# Patient Record
Sex: FEMALE | Race: BLACK OR AFRICAN AMERICAN | NOT HISPANIC OR LATINO | Employment: OTHER | URBAN - METROPOLITAN AREA
[De-identification: names, ages, dates, MRNs, and addresses within clinical notes are randomized per-mention and may not be internally consistent; named-entity substitution may affect disease eponyms.]

---

## 2020-11-05 DIAGNOSIS — I49.5 SSS (SICK SINUS SYNDROME): Primary | ICD-10-CM

## 2020-11-27 ENCOUNTER — TELEPHONE (OUTPATIENT)
Dept: CARDIOLOGY | Facility: CLINIC | Age: 80
End: 2020-11-27

## 2020-11-27 NOTE — TELEPHONE ENCOUNTER
----- Message from Melodie Gaming sent at 11/27/2020  9:20 AM CST -----  Regarding: appt  please give patient a call she would like to be seen in [place] [phone]

## 2020-11-30 ENCOUNTER — TELEPHONE (OUTPATIENT)
Dept: CARDIOLOGY | Facility: CLINIC | Age: 80
End: 2020-11-30

## 2020-11-30 DIAGNOSIS — I49.5 SICK SINUS SYNDROME: Primary | ICD-10-CM

## 2020-11-30 NOTE — TELEPHONE ENCOUNTER
----- Message from Stuart Calle sent at 11/30/2020 10:07 AM CST -----  Regarding: - gp  Pacer check needed  Heart beating faster notices when laying down     Wants  seen as well   Ws in [facility] recently   Luis Carlos Man  Male, 84 y.o., 10/10/1936  [phone]  MRN:   [id number]      [phone]

## 2020-11-30 NOTE — TELEPHONE ENCOUNTER
Spoke with pt and will see Friday or per Dr. Candelario if pacer rep can check her will get her pacer checked first.

## 2021-03-05 ENCOUNTER — OFFICE VISIT (OUTPATIENT)
Dept: CARDIOLOGY | Facility: CLINIC | Age: 81
End: 2021-03-05
Payer: MEDICARE

## 2021-03-05 VITALS
DIASTOLIC BLOOD PRESSURE: 62 MMHG | HEART RATE: 76 BPM | BODY MASS INDEX: 31.66 KG/M2 | RESPIRATION RATE: 16 BRPM | SYSTOLIC BLOOD PRESSURE: 110 MMHG | WEIGHT: 197 LBS | HEIGHT: 66 IN | OXYGEN SATURATION: 98 %

## 2021-03-05 DIAGNOSIS — I49.5 SICK SINUS SYNDROME: Primary | ICD-10-CM

## 2021-03-05 DIAGNOSIS — Z95.0 CARDIAC PACEMAKER IN SITU: ICD-10-CM

## 2021-03-05 DIAGNOSIS — E78.2 MIXED HYPERLIPIDEMIA: ICD-10-CM

## 2021-03-05 DIAGNOSIS — M19.90 ARTHRITIS: ICD-10-CM

## 2021-03-05 DIAGNOSIS — I10 ESSENTIAL HYPERTENSION: ICD-10-CM

## 2021-03-05 DIAGNOSIS — E66.01 SEVERE OBESITY: ICD-10-CM

## 2021-03-05 PROCEDURE — 99214 OFFICE O/P EST MOD 30 MIN: CPT | Mod: S$GLB,,, | Performed by: INTERNAL MEDICINE

## 2021-03-05 PROCEDURE — 99214 PR OFFICE/OUTPT VISIT, EST, LEVL IV, 30-39 MIN: ICD-10-PCS | Mod: S$GLB,,, | Performed by: INTERNAL MEDICINE

## 2021-03-05 RX ORDER — ASPIRIN 81 MG/1
81 TABLET ORAL DAILY
COMMUNITY

## 2021-03-05 RX ORDER — FERROUS SULFATE 325(65) MG
325 TABLET ORAL
COMMUNITY

## 2021-03-05 RX ORDER — CARVEDILOL 12.5 MG/1
12.5 TABLET ORAL 2 TIMES DAILY
COMMUNITY
Start: 2021-02-17

## 2021-03-05 RX ORDER — VALSARTAN AND HYDROCHLOROTHIAZIDE 160; 12.5 MG/1; MG/1
1 TABLET, FILM COATED ORAL DAILY
COMMUNITY
Start: 2021-03-03

## 2021-03-05 RX ORDER — MELOXICAM 7.5 MG/1
7.5 TABLET ORAL DAILY PRN
COMMUNITY
Start: 2020-09-23

## 2021-03-05 RX ORDER — LATANOPROST 50 UG/ML
SOLUTION/ DROPS OPHTHALMIC
COMMUNITY
Start: 2021-03-02

## 2021-03-05 RX ORDER — SIMVASTATIN 80 MG/1
80 TABLET, FILM COATED ORAL DAILY
COMMUNITY
Start: 2021-02-22

## 2021-06-25 ENCOUNTER — HOSPITAL ENCOUNTER (OUTPATIENT)
Dept: CARDIOLOGY | Facility: CLINIC | Age: 81
Discharge: HOME | End: 2021-06-25
Attending: INTERNAL MEDICINE
Payer: MEDICARE

## 2021-08-09 DIAGNOSIS — I49.5 SSS (SICK SINUS SYNDROME): Primary | ICD-10-CM

## 2021-09-17 ENCOUNTER — OFFICE VISIT (OUTPATIENT)
Dept: CARDIOLOGY | Facility: CLINIC | Age: 81
End: 2021-09-17
Payer: MEDICARE

## 2021-09-17 VITALS
WEIGHT: 191 LBS | RESPIRATION RATE: 16 BRPM | HEIGHT: 66 IN | DIASTOLIC BLOOD PRESSURE: 62 MMHG | SYSTOLIC BLOOD PRESSURE: 126 MMHG | OXYGEN SATURATION: 98 % | BODY MASS INDEX: 30.7 KG/M2 | HEART RATE: 84 BPM

## 2021-09-17 DIAGNOSIS — E78.2 MIXED HYPERLIPIDEMIA: ICD-10-CM

## 2021-09-17 DIAGNOSIS — I49.5 SICK SINUS SYNDROME: Primary | ICD-10-CM

## 2021-09-17 DIAGNOSIS — Z95.0 CARDIAC PACEMAKER IN SITU: ICD-10-CM

## 2021-09-17 DIAGNOSIS — E66.8 MODERATE OBESITY: ICD-10-CM

## 2021-09-17 DIAGNOSIS — I10 ESSENTIAL HYPERTENSION: ICD-10-CM

## 2021-09-17 PROCEDURE — 99214 OFFICE O/P EST MOD 30 MIN: CPT | Mod: S$GLB,,, | Performed by: INTERNAL MEDICINE

## 2021-09-17 PROCEDURE — 93000 ELECTROCARDIOGRAM COMPLETE: CPT | Mod: S$GLB,,, | Performed by: INTERNAL MEDICINE

## 2021-09-17 PROCEDURE — 99214 PR OFFICE/OUTPT VISIT, EST, LEVL IV, 30-39 MIN: ICD-10-PCS | Mod: S$GLB,,, | Performed by: INTERNAL MEDICINE

## 2021-09-17 PROCEDURE — 93000 EKG 12-LEAD: ICD-10-PCS | Mod: S$GLB,,, | Performed by: INTERNAL MEDICINE

## 2021-12-10 ENCOUNTER — NURSE TRIAGE (OUTPATIENT)
Dept: ADMINISTRATIVE | Facility: CLINIC | Age: 81
End: 2021-12-10
Payer: MEDICARE

## 2022-03-25 ENCOUNTER — OFFICE VISIT (OUTPATIENT)
Dept: CARDIOLOGY | Facility: CLINIC | Age: 82
End: 2022-03-25
Payer: MEDICARE

## 2022-03-25 VITALS
DIASTOLIC BLOOD PRESSURE: 70 MMHG | SYSTOLIC BLOOD PRESSURE: 122 MMHG | BODY MASS INDEX: 30.37 KG/M2 | WEIGHT: 189 LBS | HEART RATE: 76 BPM | OXYGEN SATURATION: 98 % | HEIGHT: 66 IN

## 2022-03-25 DIAGNOSIS — I49.5 SSS (SICK SINUS SYNDROME): ICD-10-CM

## 2022-03-25 DIAGNOSIS — E66.8 MODERATE OBESITY: ICD-10-CM

## 2022-03-25 DIAGNOSIS — M19.90 ARTHRITIS: ICD-10-CM

## 2022-03-25 DIAGNOSIS — Z95.0 CARDIAC PACEMAKER IN SITU: ICD-10-CM

## 2022-03-25 DIAGNOSIS — E78.2 MIXED HYPERLIPIDEMIA: ICD-10-CM

## 2022-03-25 DIAGNOSIS — I10 ESSENTIAL HYPERTENSION: Primary | ICD-10-CM

## 2022-03-25 PROCEDURE — 99214 PR OFFICE/OUTPT VISIT, EST, LEVL IV, 30-39 MIN: ICD-10-PCS | Mod: S$GLB,,, | Performed by: INTERNAL MEDICINE

## 2022-03-25 PROCEDURE — 99214 OFFICE O/P EST MOD 30 MIN: CPT | Mod: S$GLB,,, | Performed by: INTERNAL MEDICINE

## 2022-03-25 PROCEDURE — 93005 EKG 12-LEAD: ICD-10-PCS | Mod: S$GLB,,, | Performed by: INTERNAL MEDICINE

## 2022-03-25 PROCEDURE — 93005 ELECTROCARDIOGRAM TRACING: CPT | Mod: S$GLB,,, | Performed by: INTERNAL MEDICINE

## 2022-03-25 NOTE — PROGRESS NOTES
Subjective:    Patient ID:  Ralph Man is a 81 y.o. female     Chief Complaint   Patient presents with    Follow-up    Chest Pain     A tingle in chest        HPI:  Ms Ralph Man is a 81 y.o. female is here for follow-up.  Patient has been doing well no specific complaints at the present time.  Her breathing has been good denies any shortness of breath or difficulty in breathing denies any chest pain or tightness or heaviness denies any loss of consciousness falls or head injury.  Patient has had episodes where she had transiently feels lightheaded when she moves her head forward.  And even so when she bends forward she does feel lightheaded and she has to raise her head back up very gently and then she feels okay.  She has history of vertigo in the past.    Review of patient's allergies indicates:   Allergen Reactions    Celecoxib      Other reaction(s): bleeding  gi bleeding required hospitalization....had to receive blood transfusion, occured after a few doses    Ciprofloxacin      Other reaction(s): unknown    Fosinopril      Other reaction(s): unknown       Past Medical History:   Diagnosis Date    Arthritis     HTN (hypertension)     Hyperlipidemia     Sick sinus syndrome     Tachy-billy syndrome      Past Surgical History:   Procedure Laterality Date    INSERT / REPLACE / REMOVE PACEMAKER       Social History     Tobacco Use    Smoking status: Former Smoker     Packs/day: 1.00     Years: 10.00     Pack years: 10.00     Start date: 1961     Quit date: 1970     Years since quittin.2    Smokeless tobacco: Never Used   Substance Use Topics    Alcohol use: Not Currently    Drug use: Not Currently     Family History   Problem Relation Age of Onset    Ulcers Mother     Hypertension Mother     Hypertension Father         Review of Systems:   Constitution: Negative for diaphoresis and fever.   HEENT: Negative for nosebleeds.    Cardiovascular: Negative for chest pain       No dyspnea  on exertion       No leg swelling        No palpitations  Respiratory: Negative for shortness of breath and wheezing.    Hematologic/Lymphatic: Negative for bleeding problem. Does not bruise/bleed easily.   Skin: Negative for color change and rash.   Musculoskeletal: Negative for falls and myalgias.   Gastrointestinal: Negative for hematemesis and hematochezia.   Genitourinary: Negative for hematuria.   Neurological:  Occasional positive for dizziness and light-headedness.   Psychiatric/Behavioral: Negative for altered mental status and memory loss.          Objective:        Vitals:    03/25/22 1349   BP: 122/70   Pulse: 76       No results found for: WBC, HGB, HCT, PLT, CHOL, TRIG, HDL, LDLDIRECT, ALT, AST, NA, K, CL, CREATININE, BUN, CO2, TSH, PSA, INR, GLUF, HGBA1C, MICROALBUR     ECHOCARDIOGRAM RESULTS  No results found for this or any previous visit.        CURRENT/PREVIOUS VISIT EKG  Results for orders placed or performed in visit on 09/17/21   IN OFFICE EKG 12-LEAD (to [facility])    Collection Time: 09/17/21  1:10 PM    Narrative    Test Reason : I49.5,    Vent. Rate : 084 BPM     Atrial Rate : 084 BPM     P-R Int : 260 ms          QRS Dur : 074 ms      QT Int : 350 ms       P-R-T Axes : 062 033 034 degrees     QTc Int : 413 ms    Atrial-paced rhythm with prolonged AV conduction  Cannot rule out Anterior infarct ,age undetermined  Abnormal ECG  No previous ECGs available  Confirmed by James Candelario MD ([id number]) on 9/25/2021 6:37:33 PM    Referred By:             Confirmed By:James Candelario MD     No valid procedures specified.   No results found for this or any previous visit.      Physical Exam:  CONSTITUTIONAL: No fever, no chills  HEENT: Normocephalic, atraumatic,pupils reactive to light                 NECK:  No JVD no carotid bruit  CVS: S1S2+, RRR, faint systolic murmurs,   LUNGS: Clear  ABDOMEN: Soft, NT, BS+  EXTREMITIES: No cyanosis, edema  : No hernandez catheter  NEURO: AAO X 3  PSY: Normal  affect      Medication List with Changes/Refills   Current Medications    ASPIRIN (ECOTRIN) 81 MG EC TABLET    Take 81 mg by mouth once daily.    CARVEDILOL (COREG) 12.5 MG TABLET    Take 12.5 mg by mouth 2 (two) times daily.    DIOVAN -12.5 MG PER TABLET    Take 1 tablet by mouth once daily.    FERROUS SULFATE (FEOSOL) 325 MG (65 MG IRON) TAB TABLET    Take 325 mg by mouth daily with breakfast.    LATANOPROST 0.005 % OPHTHALMIC SOLUTION        MELOXICAM (MOBIC) 7.5 MG TABLET    Take 7.5 mg by mouth daily as needed.    SIMVASTATIN (ZOCOR) 80 MG TABLET    Take 80 mg by mouth once daily.             Assessment:       1. Essential hypertension    2. SSS (sick sinus syndrome)    3. Cardiac pacemaker in situ    4. Mixed hyperlipidemia    5. Moderate obesity    6. Arthritis         Plan:   1. Patient is doing very well her blood pressure is well controlled is 122/70.  And she is currently on carvedilol 12.5 mg p.o. b.i.d. and Diovan-head CTs even 60-12.5 mg p.o. q.day continue the same.  2. She is on aspirin 81 mg p.o. q.day continue the same no bleeding issues of blood in stool urine.  3. Patient has mixed hyperlipidemia and is on Zocor 80 mg p.o. q.day continue the same and she follows up with her primary physician is checking her cholesterol and liver function test.  4. Evaluated her EKG independently.  Patient is in atrial paced rhythm with prolonged AV conduction essentially no other EKG changes.  Her rate is 72 beats per minute and she has normal QT QTC intervals.  5. Patient to continue to take her iron tablets.  She is doing well with.  6. Patient's pacemaker was checked on 01/28/2022.  At that time she was found to have 5.5 years remaining on the longevity of her generator.  She has normal sensing thresholds normal impedance and normal pacing thresholds.  Normal pacemaker function.  7. Continue current management and I will see her back in the office in 6 months time.          Problem List Items Addressed  This Visit        Cardiac/Vascular    Mixed hyperlipidemia    Essential hypertension - Primary    Relevant Orders    IN OFFICE EKG 12-LEAD (to [facility])    Cardiac pacemaker in situ       Endocrine    Moderate obesity       Orthopedic    Arthritis      Other Visit Diagnoses     SSS (sick sinus syndrome)              No follow-ups on file.

## 2022-04-13 ENCOUNTER — TELEPHONE (OUTPATIENT)
Dept: CARDIOLOGY | Facility: CLINIC | Age: 82
End: 2022-04-13
Payer: MEDICARE

## 2022-04-21 ENCOUNTER — TELEPHONE (OUTPATIENT)
Dept: CARDIOLOGY | Facility: CLINIC | Age: 82
End: 2022-04-21
Payer: MEDICARE

## 2022-07-06 ENCOUNTER — TELEPHONE (OUTPATIENT)
Dept: CARDIOLOGY | Facility: CLINIC | Age: 82
End: 2022-07-06
Payer: MEDICARE

## 2022-07-06 NOTE — TELEPHONE ENCOUNTER
Spoke to  Magda about the pacer checks. They will be finding a office in [place] to change to so that we they can get the pacer checks done in [place].

## 2022-07-06 NOTE — TELEPHONE ENCOUNTER
Pt states she was informed that they are no longer doing pacemaker checks. She said she can not eladia coming all the way to [place] for a pacer check and wants to know if we know of any way she can have it done their. GP pt.

## 2022-07-06 NOTE — TELEPHONE ENCOUNTER
----- Message from Dianna Garza sent at 7/6/2022 11:12 AM CDT -----  Regarding: pacemaker check  Contact: patient  Patient want to speak with a nurse regarding pacemaker check, please call back at [phone] (home)     Case number [id number]

## 2022-09-26 ENCOUNTER — TELEPHONE (OUTPATIENT)
Dept: CARDIOLOGY | Facility: CLINIC | Age: 82
End: 2022-09-26
Payer: MEDICARE